# Patient Record
Sex: MALE | Race: OTHER | URBAN - METROPOLITAN AREA
[De-identification: names, ages, dates, MRNs, and addresses within clinical notes are randomized per-mention and may not be internally consistent; named-entity substitution may affect disease eponyms.]

---

## 2023-12-23 PROBLEM — N20.0 KIDNEY STONE: Status: ACTIVE | Noted: 2023-12-23

## 2023-12-29 ENCOUNTER — TELEPHONE (OUTPATIENT)
Age: 78
End: 2023-12-29

## 2023-12-29 NOTE — TELEPHONE ENCOUNTER
Called pt w/ results . He is out of town w/painless gross hematuria. Feels well o/w. Advised [facility]. Referral has been made.  Call back here prn

## 2024-06-14 PROBLEM — N40.1 BENIGN PROSTATIC HYPERPLASIA WITH LOWER URINARY TRACT SYMPTOMS: Status: ACTIVE | Noted: 2024-06-14

## 2025-07-22 ENCOUNTER — HOSPITAL ENCOUNTER (OUTPATIENT)
Age: 80
Discharge: HOME | End: 2025-07-25
Payer: MEDICARE

## 2025-07-22 DIAGNOSIS — R10.31 RLQ ABDOMINAL PAIN: ICD-10-CM

## 2025-07-22 PROCEDURE — 74177 CT ABD & PELVIS W/CONTRAST: CPT

## 2025-07-22 PROCEDURE — 6360000004 HC RX CONTRAST MEDICATION: Performed by: NURSE PRACTITIONER

## 2025-07-22 RX ORDER — IOPAMIDOL 755 MG/ML
100 INJECTION, SOLUTION INTRAVASCULAR
Status: COMPLETED | OUTPATIENT
Start: 2025-07-22 | End: 2025-07-22

## 2025-07-22 RX ADMIN — IOPAMIDOL 100 ML: 755 INJECTION, SOLUTION INTRAVENOUS at 14:36
